# Patient Record
(demographics unavailable — no encounter records)

---

## 2024-11-01 NOTE — PHYSICAL EXAM
[No Acute Distress] : no acute distress [Normal Oropharynx] : normal oropharynx [I] : Mallampati Class: I [Normal Appearance] : normal appearance [No Neck Mass] : no neck mass [Normal Rate/Rhythm] : normal rate/rhythm [Normal S1, S2] : normal s1, s2 [No Murmurs] : no murmurs [No Resp Distress] : no resp distress [No Acc Muscle Use] : no acc muscle use [Normal Palpation] : normal palpation [Normal Rhythm and Effort] : normal rhythm and effort [Clear to Auscultation Bilaterally] : clear to auscultation bilaterally [Normal to Percussion] : normal to percussion [No Abnormalities] : no abnormalities [Benign] : benign [Normal Gait] : normal gait [No Clubbing] : no clubbing [No Cyanosis] : no cyanosis [No Edema] : no edema [FROM] : FROM [Normal Color/ Pigmentation] : normal color/ pigmentation [No Focal Deficits] : no focal deficits [Oriented x3] : oriented x3 [Normal Affect] : normal affect

## 2024-11-01 NOTE — DISCUSSION/SUMMARY
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

## 2024-11-01 NOTE — HISTORY OF PRESENT ILLNESS
[Never] : never [TextBox_4] : [employer]  certification [employer]  exposure Certification program survivor  Certification diagnosis duodenal cancer diagnosis  Whipple  surgery Post Chemo completed CT CHEST ABD Pelvic no mets or recurrence dating past 2024  Past medical history based on intake form review otherwise unremarkable  Medications reported Aspirin 81 mg daily Allergies no reported allergies  Family history Mother  age 71 history diabetes mellitus hypertension Father at 89 with a history of diabetes hypertension Sibling age 66 history diabetes Sibling age 64 history of diabetes and hypertension Sibling 58 history hypertension  Intake form system review Low respiratory symptoms Reported positive cough Did not report wheeze shortness of breath chest tightness No reported history of asthma COPD bronchitis pneumonia Upper respiratory symptoms reported negative Cardiovascular: Negative for chest burning chest pressure chest pain palpitations Gastrointestinal reported negative for GERD symptomatology Musculoskeletal not applicable  Social history Reports non-smoker Reports social alcohol

## 2024-11-01 NOTE — PROCEDURE
[FreeTextEntry1] : blood draw  EKG November 1, 2024 Sinus rhythm normal RSR left atrial enlargement  PFT no bronchodilator administered November 1, 2024 Spirometry normal Lung volumes normal No air trapping Diffusion normal range 81% predicted  Chest x-ray PA lateral November 1, 2024 Cardiac size is normal There is very minimal left linear discoid atelectatic change Otherwise lungs are clear without parenchymal trace pleural effusions dominant pulmonary nodules Soft tissue bony structures unremarkable   Flu shot was offered declined